# Patient Record
Sex: FEMALE | Race: WHITE
[De-identification: names, ages, dates, MRNs, and addresses within clinical notes are randomized per-mention and may not be internally consistent; named-entity substitution may affect disease eponyms.]

---
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
## 2020-01-01 LAB
BACTERIA UR CULT: SIGNIFICANT CHANGE UP
SPECIMEN SOURCE: SIGNIFICANT CHANGE UP

## 2020-06-10 ENCOUNTER — EMERGENCY (EMERGENCY)
Facility: HOSPITAL | Age: 20
LOS: 1 days | Discharge: HOME | End: 2020-06-10
Attending: EMERGENCY MEDICINE
Payer: COMMERCIAL

## 2020-06-10 ENCOUNTER — APPOINTMENT (OUTPATIENT)
Dept: GASTROENTEROLOGY | Facility: CLINIC | Age: 20
End: 2020-06-10
Payer: COMMERCIAL

## 2020-06-10 VITALS
DIASTOLIC BLOOD PRESSURE: 62 MMHG | HEART RATE: 97 BPM | TEMPERATURE: 97.8 F | HEIGHT: 64 IN | WEIGHT: 96 LBS | OXYGEN SATURATION: 98 % | BODY MASS INDEX: 16.39 KG/M2 | SYSTOLIC BLOOD PRESSURE: 86 MMHG

## 2020-06-10 VITALS
HEIGHT: 64 IN | OXYGEN SATURATION: 100 % | WEIGHT: 95.9 LBS | DIASTOLIC BLOOD PRESSURE: 64 MMHG | TEMPERATURE: 98 F | RESPIRATION RATE: 18 BRPM | SYSTOLIC BLOOD PRESSURE: 90 MMHG | HEART RATE: 90 BPM

## 2020-06-10 VITALS
DIASTOLIC BLOOD PRESSURE: 82 MMHG | HEART RATE: 74 BPM | OXYGEN SATURATION: 100 % | SYSTOLIC BLOOD PRESSURE: 99 MMHG | RESPIRATION RATE: 17 BRPM

## 2020-06-10 DIAGNOSIS — R19.7 DIARRHEA, UNSPECIFIED: ICD-10-CM

## 2020-06-10 DIAGNOSIS — R11.0 NAUSEA: ICD-10-CM

## 2020-06-10 DIAGNOSIS — R63.0 ANOREXIA: ICD-10-CM

## 2020-06-10 DIAGNOSIS — E86.0 DEHYDRATION: ICD-10-CM

## 2020-06-10 DIAGNOSIS — R53.1 WEAKNESS: ICD-10-CM

## 2020-06-10 DIAGNOSIS — K52.9 NONINFECTIVE GASTROENTERITIS AND COLITIS, UNSPECIFIED: ICD-10-CM

## 2020-06-10 PROBLEM — Z78.9 OTHER SPECIFIED HEALTH STATUS: Chronic | Status: ACTIVE | Noted: 2019-12-31

## 2020-06-10 LAB
ALBUMIN SERPL ELPH-MCNC: 4.8 G/DL — SIGNIFICANT CHANGE UP (ref 3.3–5)
ALP SERPL-CCNC: 53 U/L — SIGNIFICANT CHANGE UP (ref 40–120)
ALT FLD-CCNC: 14 U/L — SIGNIFICANT CHANGE UP (ref 10–45)
ANION GAP SERPL CALC-SCNC: 15 MMOL/L — SIGNIFICANT CHANGE UP (ref 5–17)
AST SERPL-CCNC: 18 U/L — SIGNIFICANT CHANGE UP (ref 10–40)
BASOPHILS # BLD AUTO: 0.05 K/UL — SIGNIFICANT CHANGE UP (ref 0–0.2)
BASOPHILS NFR BLD AUTO: 0.6 % — SIGNIFICANT CHANGE UP (ref 0–2)
BILIRUB SERPL-MCNC: 0.7 MG/DL — SIGNIFICANT CHANGE UP (ref 0.2–1.2)
BUN SERPL-MCNC: 18 MG/DL — SIGNIFICANT CHANGE UP (ref 7–23)
CALCIUM SERPL-MCNC: 9.1 MG/DL — SIGNIFICANT CHANGE UP (ref 8.4–10.5)
CHLORIDE SERPL-SCNC: 101 MMOL/L — SIGNIFICANT CHANGE UP (ref 96–108)
CO2 SERPL-SCNC: 21 MMOL/L — LOW (ref 22–31)
CREAT SERPL-MCNC: 0.68 MG/DL — SIGNIFICANT CHANGE UP (ref 0.5–1.3)
EOSINOPHIL # BLD AUTO: 0.04 K/UL — SIGNIFICANT CHANGE UP (ref 0–0.5)
EOSINOPHIL NFR BLD AUTO: 0.4 % — SIGNIFICANT CHANGE UP (ref 0–6)
GLUCOSE SERPL-MCNC: 69 MG/DL — LOW (ref 70–99)
HCT VFR BLD CALC: 45.6 % — HIGH (ref 34.5–45)
HGB BLD-MCNC: 15.4 G/DL — SIGNIFICANT CHANGE UP (ref 11.5–15.5)
IMM GRANULOCYTES NFR BLD AUTO: 0.1 % — SIGNIFICANT CHANGE UP (ref 0–1.5)
LIDOCAIN IGE QN: 14 U/L — SIGNIFICANT CHANGE UP (ref 7–60)
LYMPHOCYTES # BLD AUTO: 3.22 K/UL — SIGNIFICANT CHANGE UP (ref 1–3.3)
LYMPHOCYTES # BLD AUTO: 35.7 % — SIGNIFICANT CHANGE UP (ref 13–44)
MCHC RBC-ENTMCNC: 30.2 PG — SIGNIFICANT CHANGE UP (ref 27–34)
MCHC RBC-ENTMCNC: 33.8 GM/DL — SIGNIFICANT CHANGE UP (ref 32–36)
MCV RBC AUTO: 89.4 FL — SIGNIFICANT CHANGE UP (ref 80–100)
MONOCYTES # BLD AUTO: 0.51 K/UL — SIGNIFICANT CHANGE UP (ref 0–0.9)
MONOCYTES NFR BLD AUTO: 5.7 % — SIGNIFICANT CHANGE UP (ref 2–14)
NEUTROPHILS # BLD AUTO: 5.18 K/UL — SIGNIFICANT CHANGE UP (ref 1.8–7.4)
NEUTROPHILS NFR BLD AUTO: 57.5 % — SIGNIFICANT CHANGE UP (ref 43–77)
NRBC # BLD: 0 /100 WBCS — SIGNIFICANT CHANGE UP (ref 0–0)
PLATELET # BLD AUTO: 232 K/UL — SIGNIFICANT CHANGE UP (ref 150–400)
POTASSIUM SERPL-MCNC: 3.8 MMOL/L — SIGNIFICANT CHANGE UP (ref 3.5–5.3)
POTASSIUM SERPL-SCNC: 3.8 MMOL/L — SIGNIFICANT CHANGE UP (ref 3.5–5.3)
PROT SERPL-MCNC: 7.1 G/DL — SIGNIFICANT CHANGE UP (ref 6–8.3)
RBC # BLD: 5.1 M/UL — SIGNIFICANT CHANGE UP (ref 3.8–5.2)
RBC # FLD: 12.2 % — SIGNIFICANT CHANGE UP (ref 10.3–14.5)
SODIUM SERPL-SCNC: 137 MMOL/L — SIGNIFICANT CHANGE UP (ref 135–145)
WBC # BLD: 9.01 K/UL — SIGNIFICANT CHANGE UP (ref 3.8–10.5)
WBC # FLD AUTO: 9.01 K/UL — SIGNIFICANT CHANGE UP (ref 3.8–10.5)

## 2020-06-10 PROCEDURE — 96374 THER/PROPH/DIAG INJ IV PUSH: CPT

## 2020-06-10 PROCEDURE — 83690 ASSAY OF LIPASE: CPT

## 2020-06-10 PROCEDURE — 96375 TX/PRO/DX INJ NEW DRUG ADDON: CPT

## 2020-06-10 PROCEDURE — 96361 HYDRATE IV INFUSION ADD-ON: CPT

## 2020-06-10 PROCEDURE — 99244 OFF/OP CNSLTJ NEW/EST MOD 40: CPT

## 2020-06-10 PROCEDURE — 99284 EMERGENCY DEPT VISIT MOD MDM: CPT | Mod: 25

## 2020-06-10 PROCEDURE — 80053 COMPREHEN METABOLIC PANEL: CPT

## 2020-06-10 PROCEDURE — 85027 COMPLETE CBC AUTOMATED: CPT

## 2020-06-10 PROCEDURE — 99284 EMERGENCY DEPT VISIT MOD MDM: CPT

## 2020-06-10 RX ORDER — BUSPIRONE HYDROCHLORIDE 7.5 MG/1
TABLET ORAL
Refills: 0 | Status: ACTIVE | COMMUNITY

## 2020-06-10 RX ORDER — SODIUM CHLORIDE 9 MG/ML
1000 INJECTION INTRAMUSCULAR; INTRAVENOUS; SUBCUTANEOUS ONCE
Refills: 0 | Status: COMPLETED | OUTPATIENT
Start: 2020-06-10 | End: 2020-06-10

## 2020-06-10 RX ORDER — METOCLOPRAMIDE HCL 10 MG
10 TABLET ORAL ONCE
Refills: 0 | Status: COMPLETED | OUTPATIENT
Start: 2020-06-10 | End: 2020-06-10

## 2020-06-10 RX ORDER — DIPHENHYDRAMINE HCL 50 MG
25 CAPSULE ORAL ONCE
Refills: 0 | Status: COMPLETED | OUTPATIENT
Start: 2020-06-10 | End: 2020-06-10

## 2020-06-10 RX ORDER — ONDANSETRON 8 MG/1
1 TABLET, FILM COATED ORAL
Qty: 9 | Refills: 0
Start: 2020-06-10 | End: 2020-06-12

## 2020-06-10 RX ADMIN — SODIUM CHLORIDE 1000 MILLILITER(S): 9 INJECTION INTRAMUSCULAR; INTRAVENOUS; SUBCUTANEOUS at 14:01

## 2020-06-10 RX ADMIN — Medication 25 MILLIGRAM(S): at 14:47

## 2020-06-10 RX ADMIN — Medication 10 MILLIGRAM(S): at 14:01

## 2020-06-10 RX ADMIN — SODIUM CHLORIDE 1000 MILLILITER(S): 9 INJECTION INTRAMUSCULAR; INTRAVENOUS; SUBCUTANEOUS at 14:49

## 2020-06-10 NOTE — ED ADULT NURSE REASSESSMENT NOTE - NS ED NURSE REASSESS COMMENT FT1
Pt reports severe anxiety and desire to leave ED. Pt presents restless after Reglan administration, and states decrease in anxiety and restlessness after Benadryl administration. Pt cleared for d/c by EFREM Sanderson and MD Lucero. Pt A+Ox3, IV removed, d/c with PRN Zofran and follow up with PCP. Pt reports severe anxiety and desire to leave ED. Pt presents restless after Reglan administration, and states decrease in anxiety and restlessness after Benadryl administration. Pt cleared for d/c by EFREM Sanderson and MD Lucero. Pt A+Ox3, IV removed, d/c with PRN Zofran and follow up with PCP. EFREM Sanderson denies desire for repeat VS prior to d/c.

## 2020-06-10 NOTE — HISTORY OF PRESENT ILLNESS
[FreeTextEntry1] : Patient is a 20-year-old female with gastrointestinal complaints in the past including abdominal pain, nausea, and irregular bowel movements.  3 to 4 days ago patient had frequent watery bowel movements associated with nausea and lower abdominal discomfort she had no fever.  She took Imodium and her bowel movements have decreased but she still complains of nausea, loss of appetite, and some loose bowel movements.  Patient feels weak.\par Patient has Hx of pelvic inflammatory disease but saw her gynecologist who felt there was no evidence of this at this time.  She has not been on any antibiotics recently.  She has had no exposure to COVID.

## 2020-06-10 NOTE — PHYSICAL EXAM
[General Appearance - Alert] : alert [General Appearance - In No Acute Distress] : in no acute distress [Sclera] : the sclera and conjunctiva were normal [PERRL With Normal Accommodation] : pupils were equal in size, round, and reactive to light [Extraocular Movements] : extraocular movements were intact [Outer Ear] : the ears and nose were normal in appearance [Oropharynx] : the oropharynx was normal [Neck Appearance] : the appearance of the neck was normal [Jugular Venous Distention Increased] : there was no jugular-venous distention [Neck Cervical Mass (___cm)] : no neck mass was observed [Thyroid Diffuse Enlargement] : the thyroid was not enlarged [Thyroid Nodule] : there were no palpable thyroid nodules [Auscultation Breath Sounds / Voice Sounds] : lungs were clear to auscultation bilaterally [Heart Rate And Rhythm] : heart rate was normal and rhythm regular [Heart Sounds Gallop] : no gallops [Heart Sounds] : normal S1 and S2 [Murmurs] : no murmurs [Heart Sounds Pericardial Friction Rub] : no pericardial rub [Bowel Sounds] : normal bowel sounds [Abdomen Soft] : soft [Abdomen Tenderness] : non-tender [] : no hepato-splenomegaly [Abdomen Mass (___ Cm)] : no abdominal mass palpated [No CVA Tenderness] : no ~M costovertebral angle tenderness [No Spinal Tenderness] : no spinal tenderness [Nail Clubbing] : no clubbing  or cyanosis of the fingernails [Abnormal Walk] : normal gait [Musculoskeletal - Swelling] : no joint swelling seen [Motor Tone] : muscle strength and tone were normal [Oriented To Time, Place, And Person] : oriented to person, place, and time [Impaired Insight] : insight and judgment were intact [Affect] : the affect was normal

## 2020-06-10 NOTE — ED PROVIDER NOTE - PROGRESS NOTE DETAILS
patient stating she wants to leave, feeling anxious, discussed trying to alleviate anxiety but patient still unwilling to remain here. understands that we have not ruled out an acute cause for symptoms. patient demonstrates understanding and wants to leave - Moon Sanderson PA-C spoke with GI who states felt dehydrated as patient was orthostatic and also felt pulsatile area in epigastrim so thought may need US. requesting zofran be sent if unwilling to say for continued workup - Moon Sanderson PA-C

## 2020-06-10 NOTE — ED PROVIDER NOTE - CLINICAL SUMMARY MEDICAL DECISION MAKING FREE TEXT BOX
Daphne Barker MD  20 yr old female here with nausea, and constipation, no vomiting, no diarrhea, no fever, no chills, on BCP, normal exam, will check labs, pregnancy test, reassess.

## 2020-06-10 NOTE — ED PROVIDER NOTE - OBJECTIVE STATEMENT
21 y/o female hx anxiety presents from GI office for abdominal pain. patient states she has had 4 years of intermittent abdominal pain and tennesmus. states she frequently has to take imodium for loose. 21 y/o female hx anxiety presents from GI office for abdominal pain. patient states she has had 4 years of intermittent abdominal pain and tenesmus. states she frequently has to take imodium for loose stools. states 4 days ago she had very loose stool so took imodium, has not had BM since then. she has nausea and decreased appetite but no vomiting. does endorse some dry heaving. never had abd surgery. sent in by GI. states she also aw a gyn for known ruptured cyst and was told about a week ago all fluid had been resorbed and nothing to do from GYN standpoint. additionally noting that she has seen physicians in the past who have been treating her chronic diarrhea has anxiety and she has recently started her on anxiety meds

## 2020-06-10 NOTE — ED PROVIDER NOTE - ATTENDING CONTRIBUTION TO CARE
I performed a history and physical exam of the patient and discussed their management with the ACP. I reviewed the ACP's note and agree with the documented findings and plan of care.  Daphne Barker MD

## 2020-06-10 NOTE — ED ADULT NURSE NOTE - OBJECTIVE STATEMENT
20 year old female a/ox3 ambulatory with PMH of anxiety, ovarian cyst and chronic abd pain x 4 years presenting to ed with abd pain and nausea x 4 days. patient states she was having diarrhea and cramping pain, which was her normal to her regular pain. patient took an imodium "iv been taking a lot of them recently" and has not had BM in 4 days. verbalized increased nausea and decreased po intake and appetite. denies fever/chills/vomiting/cough. no surgeries in past. seen by OB on 6/4 and noted with ruptured ovarian cyst. seen by GI today and sent here for further eval. on exam patient with tenderness to mid epigastric area.

## 2020-06-10 NOTE — REVIEW OF SYSTEMS
[Feeling Poorly] : feeling poorly [Feeling Tired] : feeling tired [As Noted in HPI] : as noted in HPI [Anxiety] : anxiety [Negative] : Heme/Lymph

## 2020-06-10 NOTE — ED PROVIDER NOTE - PATIENT PORTAL LINK FT
You can access the FollowMyHealth Patient Portal offered by [facility] by registering at the following website: [email or web address]. By joining [employer]’s FollowMyHealth portal, you will also be able to view your health information using other applications (apps) compatible with our system.

## 2020-06-10 NOTE — ED CLERICAL - NS ED CLERK NOTE PRE-ARRIVAL INFORMATION; ADDITIONAL PRE-ARRIVAL INFORMATION
CC/Reason For referral: abd pain, loss of appetite, feels weak, seen by GI today  Preferred Consultant(if applicable): na  Who admits for you (if needed):na  Do you have documents you would like to fax over? no  Would you still like to speak to an ED attending? please call with questions

## 2020-06-10 NOTE — ED PROVIDER NOTE - NSFOLLOWUPINSTRUCTIONS_ED_ALL_ED_FT
stay hydrated  Follow up with your Primary Care Physician within the next 2-3 days  Bring a copy of your test results with you to your appointment  Continue your current medication regimen  Return to the Emergency Room if you experience new or worsening symptoms  Take Zofran 3x per day as needed for nausea and vomiting.    Nausea / Vomiting    Nausea is the feeling that you have to vomit. As nausea gets worse, it can lead to vomiting. Vomiting puts you at an increased risk for dehydration. Older adults and people with other diseases or a weak immune system are at higher risk for dehydration. Drink clear fluids in small but frequent amounts as tolerated. Eat bland, easy-to-digest foods in small amounts as tolerated.    SEEK IMMEDIATE MEDICAL CARE IF YOU HAVE ANY OF THE FOLLOWING SYMPTOMS: fever, inability to keep sufficient fluids down, black or bloody vomitus, black or bloody stools, lightheadedness/dizziness, chest pain, severe headache, rash, shortness of breath, cold or clammy skin, confusion, pain with urination, or any signs of dehydration.

## 2020-06-10 NOTE — ASSESSMENT
[FreeTextEntry1] : Patient with likely acute gastroenteritis and now with dehydration and orthostatic hypotension.  She will be referred to the emergency room for IV hydration.  An abdominal sonogram and blood work was recommended.  Patient should also have COVID testing.\par She will be seen in follow-up in several weeks.

## 2021-01-19 PROBLEM — F41.9 ANXIETY DISORDER, UNSPECIFIED: Chronic | Status: ACTIVE | Noted: 2020-06-10

## 2021-02-09 ENCOUNTER — APPOINTMENT (OUTPATIENT)
Dept: INTERNAL MEDICINE | Facility: CLINIC | Age: 21
End: 2021-02-09

## 2022-12-23 NOTE — ED ADULT TRIAGE NOTE - BP NONINVASIVE SYSTOLIC (MM HG)
normal/ROM intact/normal gait/strength 5/5 bilateral upper extremities/strength 5/5 bilateral lower extremities 110 negative

## 2023-03-07 ENCOUNTER — APPOINTMENT (OUTPATIENT)
Dept: INTERNAL MEDICINE | Facility: CLINIC | Age: 23
End: 2023-03-07

## 2023-05-15 NOTE — PDOC
*Physical Exam





- Vital Signs


 Last Vital Signs











Temp Pulse Resp BP Pulse Ox


 


 98 F   67   16   103/65   100 


 


 02/01/19 05:58  02/01/19 05:58  02/01/19 05:58  02/01/19 05:58  02/01/19 05:58














ED Treatment Course





- Medications


Given in the ED: 


ED Medications














Discontinued Medications














Generic Name Dose Route Start Last Admin





  Trade Name Miguelito  PRN Reason Stop Dose Admin


 


Acetaminophen  1,000 mg  02/01/19 06:42  02/01/19 06:51





  Ofirmev Injection -  IVPB  02/01/19 06:43  1,000 mg





  ONCE ONE   Administration





     





     





     





     


 


Famotidine/Sodium Chloride  20 mg in 50 mls @ 100 mls/hr  02/01/19 06:13  02/01/ 19 06:26





  Pepcid 20 Mg Premixed Ivpb -  IVPB  02/01/19 06:42  100 mls/hr





  ONCE ONE   Administration





     





     





     





     


 


Sodium Chloride  1,000 mls @ 1,000 mls/hr  02/01/19 06:13  02/01/19 06:26





  Normal Saline -  IV  02/01/19 07:12  1,000 mls/hr





  ASDIR STA   Administration





     





     





     





     


 


Ondansetron HCl  4 mg  02/01/19 06:13  02/01/19 06:26





  Zofran Injection  IVPUSH  02/01/19 06:14  4 mg





  ONCE ONE   Administration





     





     





     





     














Medical Decision Making





- Medical Decision Making





02/01/19 08:00





s/o from Dr Michaels at 7AM pending reeval and UA


in summary, 18 YOF with AP, nausea and vomiting this morning.


no fever


given analgesia, GI cocktail and fluids here, reassessed, NAD and remains 

nontoxic appearing


upreg_negative


blood pressure low, but also thin and young. repeat normalized after additional 

fluids, doubt acute illness or pathology and likely baseline low BP with body 

habitus and young age.


rx zofran PRN for n/v.





dispo: Pt to be discharged in stable condition. Patient made aware of 

impression and plan, return precautions discussed (including but not limited to 

worsening pain or symptoms), fevers, or signs of infection, chest pain, 

respiratory distress, inability to tolerate oral intake, dehydration, syncope, 

or neurologic changes). Follow up with PMD and/or specialist as recommended, 

follow up information provided, take medications as instructed for duration of 

time. continue with supportive care, avoid triggers and precipitants. Patient 

does not suffer from an acute life-threatening medical condition at this time 

she is safe for outpatient follow-up. 





02/01/19 08:42





02/01/19 08:43








*DC/Admit/Observation/Transfer


Diagnosis at time of Disposition: 


Nausea & vomiting


Qualifiers:


 Vomiting type: unspecified Vomiting Intractability: non-intractable Qualified 

Code(s): R11.2 - Nausea with vomiting, unspecified








- Discharge Dispostion


Disposition: HOME


Condition at time of disposition: Stable


Decision to Admit order: No





- Prescriptions


Prescriptions: 


Ondansetron [Zofran Odt -] 4 mg SL TID PRN #9 od.tablet


 PRN Reason: Nausea And/Or Vomiting





- Referrals


Referrals: 


[facility] [place] [Provider Group]


[facility] [address] [Provider Group]





- Patient Instructions


Printed Discharge Instructions:  DI for Vomiting -- Adult


Additional Instructions: 


you came to the department for mild abdominal pain and vomiting


you were given medications here with relief.


your pregnancy test was Negative.





zofran as needed every 8 hours for nausea/vomiting, make sure to drink plenty 

of fluids





return precautions discussed (including but not limited to worsening pain or 

symptoms), fevers, or signs of infection, chest pain, respiratory distress, 

inability to tolerate oral intake, dehydration, syncope, or neurologic changes)

. Follow up with PMD  as recommended, given referrals, follow up information 

provided, take medications as instructed for duration of time. continue with 

supportive care, avoid triggers and precipitants. 





- Post Discharge Activity Show Aperture Variable?: Yes Consent: The patient's verbal consent was obtained including but not limited to risks of crusting, scabbing, blistering, scarring, darker or lighter pigmentary change, recurrence, incomplete removal and infection. Detail Level: Detailed Number Of Freeze-Thaw Cycles: 1 freeze-thaw cycle Render Note In Bullet Format When Appropriate: No Post-Care Instructions: I reviewed post-care instructions with the patient in detail: pt should expect treated lesion(s) to form a scab for ~1 week, pt may apply Vaseline or Aquaphor to crusted or scabbing areas if desired. Advised to monitor and return to clinic for re-treatment or biopsy if lesion(s) remain unresolved in ~2 months.

## 2024-01-20 NOTE — ED PROVIDER NOTE - INTERNATIONAL TRAVEL
Presented 1/7/23 to [facility] with new oxygen requirement, fatigue   1/7/24 CT Chest: Bilateral multilevel airspace consolidation, groundglass opacities, septal thickening and small bilateral pleural effusions secondary to volume overloaded as well as pneumonia.  Initial procalcitonin: 3.16 (1/8/24)  Cultures:  1/7/24 BCXx2: No growth   1/7/23 COVID/Flu/RSV: Negative   1/7/23 RP2: Negative   1/8/24 MRSA Cx: Negative  1/8/24 BCXx2: No growth    Resolved, off antibiotics now   No

## 2024-02-02 NOTE — ED ADULT NURSE NOTE - SUICIDE SCREENING QUESTION 1
Pt c/o fever, diarrhea, vomiting, headache and congestion x2 days. Pt endorses productive cough. Pt states went to urgent care and was prescribed abx.   
No

## 2024-08-09 ENCOUNTER — NON-APPOINTMENT (OUTPATIENT)
Age: 24
End: 2024-08-09

## 2024-08-09 ENCOUNTER — APPOINTMENT (OUTPATIENT)
Dept: INTERNAL MEDICINE | Facility: CLINIC | Age: 24
End: 2024-08-09

## 2024-08-09 PROBLEM — Z11.3 SCREENING FOR STDS (SEXUALLY TRANSMITTED DISEASES): Status: ACTIVE | Noted: 2024-08-09 | Resolved: 2024-08-23

## 2024-08-09 PROBLEM — F17.290 NICOTINE DEPENDENCE DUE TO VAPING TOBACCO PRODUCT: Status: ACTIVE | Noted: 2024-08-09

## 2024-08-09 PROBLEM — F41.9 ANXIETY: Status: ACTIVE | Noted: 2024-08-09

## 2024-08-09 PROBLEM — Z13.228 SCREENING FOR METABOLIC DISORDER: Status: ACTIVE | Noted: 2024-08-09

## 2024-08-09 PROCEDURE — 99204 OFFICE O/P NEW MOD 45 MIN: CPT | Mod: 25

## 2024-08-09 PROCEDURE — 36415 COLL VENOUS BLD VENIPUNCTURE: CPT

## 2024-08-11 NOTE — HISTORY OF PRESENT ILLNESS
[de-identified] : The patient is a 24-year-old F who presents to the office to establish care with new provider  PCOS - currently on Blisolvi  Anxiety - currently on Clonazepam 1mg prn anxiety Gassy -Not sure what her specific triggers are/ food sensitivities   HCM LMP: 7/22 Pap smear: 6/2024 - normal   Tdap: unknown Hep B: needs to restart series

## 2024-08-11 NOTE — HEALTH RISK ASSESSMENT
[Fair] :  ~his/her~ mood as fair [Intercurrent ED visits] : went to ED [Intercurrent Urgi Care visits] : went to urgent care [Never (0 pts)] : Never (0 points) [No] : In the past 12 months have you used drugs other than those required for medical reasons? No [No falls in past year] : Patient reported no falls in the past year [1] : 2) Feeling down, depressed, or hopeless for several days (1) [Patient declined mammogram] : Patient declined mammogram [Patient reported PAP Smear was normal] : Patient reported PAP Smear was normal [Patient declined bone density test] : Patient declined bone density test [Patient declined colonoscopy] : Patient declined colonoscopy [HIV Test offered] : HIV Test offered [Hepatitis C test offered] : Hepatitis C test offered [Change in mental status noted] : Change in mental status noted [Alone] : lives alone [Feels Safe at Home] : Feels safe at home [Fully functional (bathing, dressing, toileting, transferring, walking, feeding)] : Fully functional (bathing, dressing, toileting, transferring, walking, feeding) [Fully functional (using the telephone, shopping, preparing meals, housekeeping, doing laundry, using] : Fully functional and needs no help or supervision to perform IADLs (using the telephone, shopping, preparing meals, housekeeping, doing laundry, using transportation, managing medications and managing finances) [Smoke Detector] : smoke detector [Carbon Monoxide Detector] : carbon monoxide detector [Seat Belt] :  uses seat belt [Sunscreen] : uses sunscreen [PHQ-2 Negative - No further assessment needed] : PHQ-2 Negative - No further assessment needed [FreeTextEntry1] : Low BP, history of diabetes in the family & GI issues. Tested for breast cancer gene (negative). Not tested yet for Ovarian cancer gene. Has PCOS  [de-identified] : Covid 7/2024 & 4/2024 [facility] (Gas buildup) [de-identified] : GYN & GI. 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

## 2024-08-11 NOTE — HISTORY OF PRESENT ILLNESS
[de-identified] : The patient is a 24-year-old F who presents to the office to establish care with new provider  PCOS - currently on Blisolvi  Anxiety - currently on Clonazepam 1mg prn anxiety Gassy -Not sure what her specific triggers are/ food sensitivities   HCM LMP: 7/22 Pap smear: 6/2024 - normal   Tdap: unknown Hep B: needs to restart series

## 2024-08-11 NOTE — HEALTH RISK ASSESSMENT
[Fair] :  ~his/her~ mood as fair [Intercurrent ED visits] : went to ED [Intercurrent Urgi Care visits] : went to urgent care [Never (0 pts)] : Never (0 points) [No] : In the past 12 months have you used drugs other than those required for medical reasons? No [No falls in past year] : Patient reported no falls in the past year [1] : 2) Feeling down, depressed, or hopeless for several days (1) [Patient declined mammogram] : Patient declined mammogram [Patient reported PAP Smear was normal] : Patient reported PAP Smear was normal [Patient declined bone density test] : Patient declined bone density test [Patient declined colonoscopy] : Patient declined colonoscopy [HIV Test offered] : HIV Test offered [Hepatitis C test offered] : Hepatitis C test offered [Change in mental status noted] : Change in mental status noted [Alone] : lives alone [Feels Safe at Home] : Feels safe at home [Fully functional (bathing, dressing, toileting, transferring, walking, feeding)] : Fully functional (bathing, dressing, toileting, transferring, walking, feeding) [Fully functional (using the telephone, shopping, preparing meals, housekeeping, doing laundry, using] : Fully functional and needs no help or supervision to perform IADLs (using the telephone, shopping, preparing meals, housekeeping, doing laundry, using transportation, managing medications and managing finances) [Smoke Detector] : smoke detector [Carbon Monoxide Detector] : carbon monoxide detector [Seat Belt] :  uses seat belt [Sunscreen] : uses sunscreen [PHQ-2 Negative - No further assessment needed] : PHQ-2 Negative - No further assessment needed [FreeTextEntry1] : Low BP, history of diabetes in the family & GI issues. Tested for breast cancer gene (negative). Not tested yet for Ovarian cancer gene. Has PCOS  [de-identified] : Covid 7/2024 & 4/2024 [facility] (Gas buildup) [de-identified] : GYN & GI. 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

## 2024-08-29 RX ORDER — CLONAZEPAM 1 MG/1
1 TABLET ORAL DAILY
Qty: 30 | Refills: 0 | Status: ACTIVE | COMMUNITY
Start: 2024-08-29 | End: 1900-01-01

## 2024-09-05 NOTE — ED PROVIDER NOTE - PHYSICAL EXAMINATION
Patient calling stating that he waited too late to schedule his colonoscopy, the pre-admit schedulers can no longer find the referral and would like PCP to place a new referral.   GEN - NAD; well appearing; A+Ox3   HEAD - NC/AT, No visible Ecchymosis, No Abrasions, No Lacerations/Skin Tears     EYES - EOMI, PERRL, no conjunctival pallor, no scleral icterus  ENT -   mucous membranes  moist , no discharge      NECK - Neck supple, No LAD, No Swelling  PULM - CTA B/L,  symmetric breath sounds  COR -  RRR, S1 S2, no murmurs  ABD - Minimal Tenderness over Epigastric area, soft, no guarding, no rebound, no masses    BACK - no CVA tenderness, nontender spine     EXTREMS - 0+ edema, no gross deformity, warm and well perfused    SKIN - no rash or bruising      NEUROLOGIC - alert, CN2-12 intact, sensation nl, motor 5/5 RUE/LUE/RLE/LLE

## 2024-09-13 ENCOUNTER — APPOINTMENT (OUTPATIENT)
Dept: INTERNAL MEDICINE | Facility: CLINIC | Age: 24
End: 2024-09-13

## 2025-01-31 ENCOUNTER — LABORATORY RESULT (OUTPATIENT)
Age: 25
End: 2025-01-31

## 2025-01-31 ENCOUNTER — APPOINTMENT (OUTPATIENT)
Dept: INTERNAL MEDICINE | Facility: CLINIC | Age: 25
End: 2025-01-31

## 2025-01-31 VITALS
DIASTOLIC BLOOD PRESSURE: 70 MMHG | WEIGHT: 108 LBS | BODY MASS INDEX: 18.44 KG/M2 | OXYGEN SATURATION: 97 % | HEART RATE: 92 BPM | TEMPERATURE: 97.8 F | HEIGHT: 64 IN | SYSTOLIC BLOOD PRESSURE: 108 MMHG

## 2025-01-31 DIAGNOSIS — R10.33 PERIUMBILICAL PAIN: ICD-10-CM

## 2025-01-31 DIAGNOSIS — N89.8 OTHER SPECIFIED NONINFLAMMATORY DISORDERS OF VAGINA: ICD-10-CM

## 2025-01-31 DIAGNOSIS — R11.0 NAUSEA: ICD-10-CM

## 2025-01-31 DIAGNOSIS — F41.9 ANXIETY DISORDER, UNSPECIFIED: ICD-10-CM

## 2025-01-31 DIAGNOSIS — Z13.228 ENCOUNTER FOR SCREENING FOR OTHER METABOLIC DISORDERS: ICD-10-CM

## 2025-01-31 PROCEDURE — 36415 COLL VENOUS BLD VENIPUNCTURE: CPT

## 2025-01-31 PROCEDURE — 99214 OFFICE O/P EST MOD 30 MIN: CPT

## 2025-02-03 LAB
25(OH)D3 SERPL-MCNC: 20 NG/ML
ALBUMIN SERPL ELPH-MCNC: 4.2 G/DL
ALP BLD-CCNC: 45 U/L
ALT SERPL-CCNC: 11 U/L
ANION GAP SERPL CALC-SCNC: 12 MMOL/L
APPEARANCE: CLEAR
AST SERPL-CCNC: 16 U/L
BASOPHILS # BLD AUTO: 0.05 K/UL
BASOPHILS NFR BLD AUTO: 0.6 %
BILIRUB SERPL-MCNC: 0.2 MG/DL
BILIRUBIN URINE: NEGATIVE
BLOOD URINE: NEGATIVE
BUN SERPL-MCNC: 12 MG/DL
CALCIUM SERPL-MCNC: 9.1 MG/DL
CHLORIDE SERPL-SCNC: 106 MMOL/L
CHOLEST SERPL-MCNC: 181 MG/DL
CO2 SERPL-SCNC: 22 MMOL/L
COLOR: YELLOW
CREAT SERPL-MCNC: 0.74 MG/DL
EGFR: 116 ML/MIN/1.73M2
EOSINOPHIL # BLD AUTO: 0.09 K/UL
EOSINOPHIL NFR BLD AUTO: 1 %
FERRITIN SERPL-MCNC: 51 NG/ML
FOLATE SERPL-MCNC: 16.3 NG/ML
GLUCOSE QUALITATIVE U: NEGATIVE MG/DL
GLUCOSE SERPL-MCNC: 109 MG/DL
HCT VFR BLD CALC: 41.2 %
HDLC SERPL-MCNC: 57 MG/DL
HGB BLD-MCNC: 13.8 G/DL
IMM GRANULOCYTES NFR BLD AUTO: 0.3 %
IRON SATN MFR SERPL: 38 %
IRON SERPL-MCNC: 144 UG/DL
KETONES URINE: NEGATIVE MG/DL
LDLC SERPL CALC-MCNC: 105 MG/DL
LEUKOCYTE ESTERASE URINE: NEGATIVE
LYMPHOCYTES # BLD AUTO: 3.04 K/UL
LYMPHOCYTES NFR BLD AUTO: 34 %
MAN DIFF?: NORMAL
MCHC RBC-ENTMCNC: 29.9 PG
MCHC RBC-ENTMCNC: 33.5 G/DL
MCV RBC AUTO: 89.4 FL
MONOCYTES # BLD AUTO: 0.47 K/UL
MONOCYTES NFR BLD AUTO: 5.3 %
NEUTROPHILS # BLD AUTO: 5.25 K/UL
NEUTROPHILS NFR BLD AUTO: 58.8 %
NITRITE URINE: NEGATIVE
NONHDLC SERPL-MCNC: 124 MG/DL
PH URINE: 6
PLATELET # BLD AUTO: 283 K/UL
POTASSIUM SERPL-SCNC: 4.2 MMOL/L
PROT SERPL-MCNC: 6.6 G/DL
PROTEIN URINE: NEGATIVE MG/DL
RBC # BLD: 4.61 M/UL
RBC # FLD: 12.8 %
SODIUM SERPL-SCNC: 140 MMOL/L
SPECIFIC GRAVITY URINE: 1.02
TIBC SERPL-MCNC: 379 UG/DL
TRIGL SERPL-MCNC: 103 MG/DL
TSH SERPL-ACNC: 1.65 UIU/ML
UIBC SERPL-MCNC: 235 UG/DL
UROBILINOGEN URINE: 0.2 MG/DL
VIT B12 SERPL-MCNC: 543 PG/ML
WBC # FLD AUTO: 8.93 K/UL

## 2025-02-06 LAB
A VAGINAE DNA VAG QL NAA+PROBE: NORMAL
BAKER'S YEAST AB QL: 8.3 UNITS
BAKER'S YEAST IGA QL IA: 7.7 UNITS
BAKER'S YEAST IGA QN IA: NEGATIVE
BAKER'S YEAST IGG QN IA: NEGATIVE
BVAB2 DNA VAG QL NAA+PROBE: NORMAL
C KRUSEI DNA VAG QL NAA+PROBE: NORMAL
C TRACH RRNA SPEC QL NAA+PROBE: NEGATIVE
CANDIDA DNA VAG QL NAA+PROBE: NORMAL
MEGA1 DNA VAG QL NAA+PROBE: NORMAL
N GONORRHOEA RRNA SPEC QL NAA+PROBE: NEGATIVE
T VAGINALIS RRNA SPEC QL NAA+PROBE: NORMAL